# Patient Record
Sex: MALE | Race: WHITE | Employment: OTHER | URBAN - METROPOLITAN AREA
[De-identification: names, ages, dates, MRNs, and addresses within clinical notes are randomized per-mention and may not be internally consistent; named-entity substitution may affect disease eponyms.]

---

## 2023-11-09 ENCOUNTER — HOSPITAL ENCOUNTER (INPATIENT)
Facility: HOSPITAL | Age: 78
LOS: 1 days | Discharge: HOME | DRG: 683 | End: 2023-11-10
Attending: EMERGENCY MEDICINE | Admitting: INTERNAL MEDICINE
Payer: MEDICARE

## 2023-11-09 ENCOUNTER — APPOINTMENT (OUTPATIENT)
Dept: RADIOLOGY | Facility: HOSPITAL | Age: 78
DRG: 683 | End: 2023-11-09
Payer: MEDICARE

## 2023-11-09 ENCOUNTER — HOSPITAL ENCOUNTER (OUTPATIENT)
Dept: CARDIOLOGY | Facility: HOSPITAL | Age: 78
Discharge: HOME | End: 2023-11-09
Payer: MEDICARE

## 2023-11-09 DIAGNOSIS — R50.81 FEVER IN OTHER DISEASES: ICD-10-CM

## 2023-11-09 DIAGNOSIS — N17.9 AKI (ACUTE KIDNEY INJURY) (CMS-HCC): Primary | ICD-10-CM

## 2023-11-09 DIAGNOSIS — Z79.01 ANTICOAGULATED: ICD-10-CM

## 2023-11-09 DIAGNOSIS — F32.89 OTHER DEPRESSION: ICD-10-CM

## 2023-11-09 DIAGNOSIS — F51.01 PRIMARY INSOMNIA: ICD-10-CM

## 2023-11-09 DIAGNOSIS — N40.0 BENIGN PROSTATIC HYPERPLASIA WITHOUT LOWER URINARY TRACT SYMPTOMS: ICD-10-CM

## 2023-11-09 DIAGNOSIS — E78.00 HYPERCHOLESTEREMIA: ICD-10-CM

## 2023-11-09 DIAGNOSIS — I10 HYPERTENSION, UNSPECIFIED TYPE: ICD-10-CM

## 2023-11-09 DIAGNOSIS — R53.1 WEAKNESS: ICD-10-CM

## 2023-11-09 LAB
ALBUMIN SERPL BCP-MCNC: 4.8 G/DL (ref 3.4–5)
ALP SERPL-CCNC: 163 U/L (ref 33–136)
ALT SERPL W P-5'-P-CCNC: 35 U/L (ref 10–52)
AMORPH CRY #/AREA UR COMP ASSIST: ABNORMAL /HPF
ANION GAP SERPL CALC-SCNC: 20 MMOL/L (ref 10–20)
APPEARANCE UR: ABNORMAL
APTT PPP: 38 SECONDS (ref 27–38)
AST SERPL W P-5'-P-CCNC: 27 U/L (ref 9–39)
BACTERIA #/AREA URNS AUTO: ABNORMAL /HPF
BASOPHILS # BLD AUTO: 0.07 X10*3/UL (ref 0–0.1)
BASOPHILS NFR BLD AUTO: 0.5 %
BILIRUB SERPL-MCNC: 1 MG/DL (ref 0–1.2)
BILIRUB UR STRIP.AUTO-MCNC: NEGATIVE MG/DL
BNP SERPL-MCNC: 46 PG/ML (ref 0–99)
BUN SERPL-MCNC: 83 MG/DL (ref 6–23)
CALCIUM SERPL-MCNC: 9.5 MG/DL (ref 8.6–10.3)
CAOX CRY #/AREA UR COMP ASSIST: ABNORMAL /HPF
CARDIAC TROPONIN I PNL SERPL HS: 28 NG/L (ref 0–20)
CARDIAC TROPONIN I PNL SERPL HS: 36 NG/L (ref 0–20)
CHLORIDE SERPL-SCNC: 92 MMOL/L (ref 98–107)
CO2 SERPL-SCNC: 20 MMOL/L (ref 21–32)
COLOR UR: YELLOW
CREAT SERPL-MCNC: 2.4 MG/DL (ref 0.5–1.3)
EOSINOPHIL # BLD AUTO: 0.11 X10*3/UL (ref 0–0.4)
EOSINOPHIL NFR BLD AUTO: 0.8 %
ERYTHROCYTE [DISTWIDTH] IN BLOOD BY AUTOMATED COUNT: 15 % (ref 11.5–14.5)
FLUAV RNA RESP QL NAA+PROBE: NOT DETECTED
FLUBV RNA RESP QL NAA+PROBE: NOT DETECTED
GFR SERPL CREATININE-BSD FRML MDRD: 27 ML/MIN/1.73M*2
GLUCOSE BLD MANUAL STRIP-MCNC: 208 MG/DL (ref 74–99)
GLUCOSE SERPL-MCNC: 200 MG/DL (ref 74–99)
GLUCOSE UR STRIP.AUTO-MCNC: ABNORMAL MG/DL
HCT VFR BLD AUTO: 56.1 % (ref 41–52)
HGB BLD-MCNC: 18.9 G/DL (ref 13.5–17.5)
HYALINE CASTS #/AREA URNS AUTO: ABNORMAL /LPF
IMM GRANULOCYTES # BLD AUTO: 0.18 X10*3/UL (ref 0–0.5)
IMM GRANULOCYTES NFR BLD AUTO: 1.3 % (ref 0–0.9)
INR PPP: 2.3 (ref 0.9–1.1)
KETONES UR STRIP.AUTO-MCNC: NEGATIVE MG/DL
LACTATE SERPL-SCNC: 1.4 MMOL/L (ref 0.4–2)
LACTATE SERPL-SCNC: 2.1 MMOL/L (ref 0.4–2)
LEUKOCYTE ESTERASE UR QL STRIP.AUTO: NEGATIVE
LYMPHOCYTES # BLD AUTO: 0.96 X10*3/UL (ref 0.8–3)
LYMPHOCYTES NFR BLD AUTO: 7 %
MCH RBC QN AUTO: 29.6 PG (ref 26–34)
MCHC RBC AUTO-ENTMCNC: 33.7 G/DL (ref 32–36)
MCV RBC AUTO: 88 FL (ref 80–100)
MONOCYTES # BLD AUTO: 0.99 X10*3/UL (ref 0.05–0.8)
MONOCYTES NFR BLD AUTO: 7.2 %
MUCOUS THREADS #/AREA URNS AUTO: ABNORMAL /LPF
NEUTROPHILS # BLD AUTO: 11.45 X10*3/UL (ref 1.6–5.5)
NEUTROPHILS NFR BLD AUTO: 83.2 %
NITRITE UR QL STRIP.AUTO: NEGATIVE
NRBC BLD-RTO: 0 /100 WBCS (ref 0–0)
PH UR STRIP.AUTO: 5 [PH]
PLATELET # BLD AUTO: 262 X10*3/UL (ref 150–450)
POTASSIUM SERPL-SCNC: 4 MMOL/L (ref 3.5–5.3)
PROT SERPL-MCNC: 8.1 G/DL (ref 6.4–8.2)
PROT UR STRIP.AUTO-MCNC: ABNORMAL MG/DL
PROTHROMBIN TIME: 26.4 SECONDS (ref 9.8–12.8)
RBC # BLD AUTO: 6.39 X10*6/UL (ref 4.5–5.9)
RBC # UR STRIP.AUTO: ABNORMAL /UL
RBC #/AREA URNS AUTO: ABNORMAL /HPF
SARS-COV-2 RNA RESP QL NAA+PROBE: NOT DETECTED
SODIUM SERPL-SCNC: 128 MMOL/L (ref 136–145)
SP GR UR STRIP.AUTO: 1.02
UROBILINOGEN UR STRIP.AUTO-MCNC: <2 MG/DL
WBC # BLD AUTO: 13.8 X10*3/UL (ref 4.4–11.3)
WBC #/AREA URNS AUTO: ABNORMAL /HPF

## 2023-11-09 PROCEDURE — 71045 X-RAY EXAM CHEST 1 VIEW: CPT

## 2023-11-09 PROCEDURE — 36415 COLL VENOUS BLD VENIPUNCTURE: CPT | Performed by: PHYSICIAN ASSISTANT

## 2023-11-09 PROCEDURE — 74176 CT ABD & PELVIS W/O CONTRAST: CPT | Performed by: RADIOLOGY

## 2023-11-09 PROCEDURE — 83605 ASSAY OF LACTIC ACID: CPT | Performed by: PHYSICIAN ASSISTANT

## 2023-11-09 PROCEDURE — G0378 HOSPITAL OBSERVATION PER HR: HCPCS

## 2023-11-09 PROCEDURE — 85730 THROMBOPLASTIN TIME PARTIAL: CPT | Performed by: PHYSICIAN ASSISTANT

## 2023-11-09 PROCEDURE — 83880 ASSAY OF NATRIURETIC PEPTIDE: CPT | Performed by: INTERNAL MEDICINE

## 2023-11-09 PROCEDURE — 99285 EMERGENCY DEPT VISIT HI MDM: CPT | Mod: 25 | Performed by: EMERGENCY MEDICINE

## 2023-11-09 PROCEDURE — 84484 ASSAY OF TROPONIN QUANT: CPT | Performed by: PHYSICIAN ASSISTANT

## 2023-11-09 PROCEDURE — 85025 COMPLETE CBC W/AUTO DIFF WBC: CPT | Performed by: PHYSICIAN ASSISTANT

## 2023-11-09 PROCEDURE — 70450 CT HEAD/BRAIN W/O DYE: CPT

## 2023-11-09 PROCEDURE — 80053 COMPREHEN METABOLIC PANEL: CPT | Performed by: PHYSICIAN ASSISTANT

## 2023-11-09 PROCEDURE — 74176 CT ABD & PELVIS W/O CONTRAST: CPT

## 2023-11-09 PROCEDURE — 2500000002 HC RX 250 W HCPCS SELF ADMINISTERED DRUGS (ALT 637 FOR MEDICARE OP, ALT 636 FOR OP/ED): Performed by: INTERNAL MEDICINE

## 2023-11-09 PROCEDURE — 87636 SARSCOV2 & INF A&B AMP PRB: CPT | Performed by: PHYSICIAN ASSISTANT

## 2023-11-09 PROCEDURE — 93005 ELECTROCARDIOGRAM TRACING: CPT

## 2023-11-09 PROCEDURE — 82947 ASSAY GLUCOSE BLOOD QUANT: CPT

## 2023-11-09 PROCEDURE — 2500000004 HC RX 250 GENERAL PHARMACY W/ HCPCS (ALT 636 FOR OP/ED): Performed by: PHYSICIAN ASSISTANT

## 2023-11-09 PROCEDURE — 96372 THER/PROPH/DIAG INJ SC/IM: CPT

## 2023-11-09 PROCEDURE — 85610 PROTHROMBIN TIME: CPT | Performed by: PHYSICIAN ASSISTANT

## 2023-11-09 PROCEDURE — 2500000004 HC RX 250 GENERAL PHARMACY W/ HCPCS (ALT 636 FOR OP/ED): Performed by: INTERNAL MEDICINE

## 2023-11-09 PROCEDURE — 87040 BLOOD CULTURE FOR BACTERIA: CPT | Mod: PARLAB | Performed by: PHYSICIAN ASSISTANT

## 2023-11-09 PROCEDURE — 2500000001 HC RX 250 WO HCPCS SELF ADMINISTERED DRUGS (ALT 637 FOR MEDICARE OP): Performed by: INTERNAL MEDICINE

## 2023-11-09 PROCEDURE — 2500000005 HC RX 250 GENERAL PHARMACY W/O HCPCS: Performed by: PHYSICIAN ASSISTANT

## 2023-11-09 PROCEDURE — 70450 CT HEAD/BRAIN W/O DYE: CPT | Performed by: RADIOLOGY

## 2023-11-09 PROCEDURE — 81001 URINALYSIS AUTO W/SCOPE: CPT | Performed by: PHYSICIAN ASSISTANT

## 2023-11-09 PROCEDURE — 71045 X-RAY EXAM CHEST 1 VIEW: CPT | Performed by: RADIOLOGY

## 2023-11-09 RX ORDER — ACETAMINOPHEN 650 MG/1
650 SUPPOSITORY RECTAL EVERY 4 HOURS PRN
Status: DISCONTINUED | OUTPATIENT
Start: 2023-11-09 | End: 2023-11-10 | Stop reason: HOSPADM

## 2023-11-09 RX ORDER — ACETAMINOPHEN 160 MG/5ML
650 SOLUTION ORAL EVERY 4 HOURS PRN
Status: DISCONTINUED | OUTPATIENT
Start: 2023-11-09 | End: 2023-11-10 | Stop reason: HOSPADM

## 2023-11-09 RX ORDER — LIDOCAINE 560 MG/1
1 PATCH PERCUTANEOUS; TOPICAL; TRANSDERMAL DAILY
Status: DISCONTINUED | OUTPATIENT
Start: 2023-11-09 | End: 2023-11-10 | Stop reason: HOSPADM

## 2023-11-09 RX ORDER — SODIUM CHLORIDE 9 MG/ML
100 INJECTION, SOLUTION INTRAVENOUS CONTINUOUS
Status: DISCONTINUED | OUTPATIENT
Start: 2023-11-09 | End: 2023-11-10 | Stop reason: HOSPADM

## 2023-11-09 RX ORDER — WARFARIN SODIUM 5 MG/1
5 TABLET ORAL DAILY
Status: DISCONTINUED | OUTPATIENT
Start: 2023-11-10 | End: 2023-11-10 | Stop reason: HOSPADM

## 2023-11-09 RX ORDER — DEXTROSE 50 % IN WATER (D50W) INTRAVENOUS SYRINGE
25
Status: DISCONTINUED | OUTPATIENT
Start: 2023-11-09 | End: 2023-11-09

## 2023-11-09 RX ORDER — ONDANSETRON 4 MG/1
4 TABLET, FILM COATED ORAL EVERY 8 HOURS PRN
Status: DISCONTINUED | OUTPATIENT
Start: 2023-11-09 | End: 2023-11-10 | Stop reason: HOSPADM

## 2023-11-09 RX ORDER — ACETAMINOPHEN 325 MG/1
650 TABLET ORAL EVERY 4 HOURS PRN
Status: DISCONTINUED | OUTPATIENT
Start: 2023-11-09 | End: 2023-11-10 | Stop reason: HOSPADM

## 2023-11-09 RX ORDER — TALC
3 POWDER (GRAM) TOPICAL DAILY
Status: DISCONTINUED | OUTPATIENT
Start: 2023-11-09 | End: 2023-11-10 | Stop reason: HOSPADM

## 2023-11-09 RX ORDER — ONDANSETRON HYDROCHLORIDE 2 MG/ML
4 INJECTION, SOLUTION INTRAVENOUS EVERY 8 HOURS PRN
Status: DISCONTINUED | OUTPATIENT
Start: 2023-11-09 | End: 2023-11-10 | Stop reason: HOSPADM

## 2023-11-09 RX ORDER — INSULIN LISPRO 100 [IU]/ML
0-10 INJECTION, SOLUTION INTRAVENOUS; SUBCUTANEOUS
Status: DISCONTINUED | OUTPATIENT
Start: 2023-11-09 | End: 2023-11-10 | Stop reason: HOSPADM

## 2023-11-09 RX ADMIN — SODIUM CHLORIDE 100 ML/HR: 9 INJECTION, SOLUTION INTRAVENOUS at 23:33

## 2023-11-09 RX ADMIN — ACETAMINOPHEN 650 MG: 325 TABLET ORAL at 23:32

## 2023-11-09 RX ADMIN — SODIUM CHLORIDE 500 ML: 9 INJECTION, SOLUTION INTRAVENOUS at 15:00

## 2023-11-09 RX ADMIN — LIDOCAINE 1 PATCH: 4 PATCH TOPICAL at 17:31

## 2023-11-09 RX ADMIN — INSULIN LISPRO 4 UNITS: 100 INJECTION, SOLUTION INTRAVENOUS; SUBCUTANEOUS at 21:12

## 2023-11-09 RX ADMIN — Medication 3 MG: at 23:32

## 2023-11-09 SDOH — SOCIAL STABILITY: SOCIAL INSECURITY: DO YOU FEEL UNSAFE GOING BACK TO THE PLACE WHERE YOU ARE LIVING?: NO

## 2023-11-09 SDOH — SOCIAL STABILITY: SOCIAL INSECURITY: ARE YOU OR HAVE YOU BEEN THREATENED OR ABUSED PHYSICALLY, EMOTIONALLY, OR SEXUALLY BY ANYONE?: NO

## 2023-11-09 SDOH — SOCIAL STABILITY: SOCIAL INSECURITY: DOES ANYONE TRY TO KEEP YOU FROM HAVING/CONTACTING OTHER FRIENDS OR DOING THINGS OUTSIDE YOUR HOME?: NO

## 2023-11-09 SDOH — SOCIAL STABILITY: SOCIAL INSECURITY: DO YOU FEEL ANYONE HAS EXPLOITED OR TAKEN ADVANTAGE OF YOU FINANCIALLY OR OF YOUR PERSONAL PROPERTY?: NO

## 2023-11-09 SDOH — SOCIAL STABILITY: SOCIAL INSECURITY: WERE YOU ABLE TO COMPLETE ALL THE BEHAVIORAL HEALTH SCREENINGS?: YES

## 2023-11-09 SDOH — SOCIAL STABILITY: SOCIAL INSECURITY: HAS ANYONE EVER THREATENED TO HURT YOUR FAMILY OR YOUR PETS?: NO

## 2023-11-09 SDOH — SOCIAL STABILITY: SOCIAL INSECURITY: ABUSE: ADULT

## 2023-11-09 SDOH — SOCIAL STABILITY: SOCIAL INSECURITY: ARE THERE ANY APPARENT SIGNS OF INJURIES/BEHAVIORS THAT COULD BE RELATED TO ABUSE/NEGLECT?: NO

## 2023-11-09 SDOH — SOCIAL STABILITY: SOCIAL INSECURITY: HAVE YOU HAD THOUGHTS OF HARMING ANYONE ELSE?: NO

## 2023-11-09 ASSESSMENT — COGNITIVE AND FUNCTIONAL STATUS - GENERAL
PATIENT BASELINE BEDBOUND: NO
MOBILITY SCORE: 18
HELP NEEDED FOR BATHING: A LITTLE
PERSONAL GROOMING: A LITTLE
MOVING TO AND FROM BED TO CHAIR: A LITTLE
WALKING IN HOSPITAL ROOM: A LITTLE
MOVING FROM LYING ON BACK TO SITTING ON SIDE OF FLAT BED WITH BEDRAILS: A LITTLE
TURNING FROM BACK TO SIDE WHILE IN FLAT BAD: A LITTLE
STANDING UP FROM CHAIR USING ARMS: A LITTLE
CLIMB 3 TO 5 STEPS WITH RAILING: A LITTLE
DAILY ACTIVITIY SCORE: 18
EATING MEALS: A LITTLE
TOILETING: A LITTLE
DRESSING REGULAR LOWER BODY CLOTHING: A LITTLE
DRESSING REGULAR UPPER BODY CLOTHING: A LITTLE

## 2023-11-09 ASSESSMENT — LIFESTYLE VARIABLES
PRESCIPTION_ABUSE_PAST_12_MONTHS: NO
HOW MANY STANDARD DRINKS CONTAINING ALCOHOL DO YOU HAVE ON A TYPICAL DAY: PATIENT DOES NOT DRINK
SUBSTANCE_ABUSE_PAST_12_MONTHS: NO
AUDIT-C TOTAL SCORE: 0
SKIP TO QUESTIONS 9-10: 1
AUDIT-C TOTAL SCORE: 0
HOW OFTEN DO YOU HAVE A DRINK CONTAINING ALCOHOL: NEVER
HOW OFTEN DO YOU HAVE 6 OR MORE DRINKS ON ONE OCCASION: NEVER

## 2023-11-09 ASSESSMENT — ACTIVITIES OF DAILY LIVING (ADL)
BATHING: INDEPENDENT
GROOMING: INDEPENDENT
TOILETING: INDEPENDENT
DRESSING YOURSELF: INDEPENDENT
WALKS IN HOME: INDEPENDENT
ADEQUATE_TO_COMPLETE_ADL: YES
JUDGMENT_ADEQUATE_SAFELY_COMPLETE_DAILY_ACTIVITIES: YES
HEARING - LEFT EAR: FUNCTIONAL
LACK_OF_TRANSPORTATION: NO
PATIENT'S MEMORY ADEQUATE TO SAFELY COMPLETE DAILY ACTIVITIES?: YES
FEEDING YOURSELF: INDEPENDENT
HEARING - RIGHT EAR: FUNCTIONAL

## 2023-11-09 ASSESSMENT — COLUMBIA-SUICIDE SEVERITY RATING SCALE - C-SSRS
6. HAVE YOU EVER DONE ANYTHING, STARTED TO DO ANYTHING, OR PREPARED TO DO ANYTHING TO END YOUR LIFE?: NO
1. IN THE PAST MONTH, HAVE YOU WISHED YOU WERE DEAD OR WISHED YOU COULD GO TO SLEEP AND NOT WAKE UP?: NO
2. HAVE YOU ACTUALLY HAD ANY THOUGHTS OF KILLING YOURSELF?: NO

## 2023-11-09 ASSESSMENT — PATIENT HEALTH QUESTIONNAIRE - PHQ9
2. FEELING DOWN, DEPRESSED OR HOPELESS: NOT AT ALL
1. LITTLE INTEREST OR PLEASURE IN DOING THINGS: NOT AT ALL
SUM OF ALL RESPONSES TO PHQ9 QUESTIONS 1 & 2: 0

## 2023-11-10 ENCOUNTER — APPOINTMENT (OUTPATIENT)
Dept: RADIOLOGY | Facility: HOSPITAL | Age: 78
DRG: 683 | End: 2023-11-10
Payer: MEDICARE

## 2023-11-10 VITALS
WEIGHT: 235.89 LBS | BODY MASS INDEX: 33.02 KG/M2 | TEMPERATURE: 96.6 F | SYSTOLIC BLOOD PRESSURE: 123 MMHG | OXYGEN SATURATION: 95 % | RESPIRATION RATE: 16 BRPM | DIASTOLIC BLOOD PRESSURE: 67 MMHG | HEIGHT: 71 IN | HEART RATE: 61 BPM

## 2023-11-10 PROBLEM — N17.9 ACUTE KIDNEY INJURY (CMS-HCC): Status: RESOLVED | Noted: 2023-11-09 | Resolved: 2023-11-10

## 2023-11-10 PROBLEM — N17.9 AKI (ACUTE KIDNEY INJURY) (CMS-HCC): Status: RESOLVED | Noted: 2023-11-10 | Resolved: 2023-11-10

## 2023-11-10 PROBLEM — R50.9 FEVER: Status: RESOLVED | Noted: 2023-11-10 | Resolved: 2023-11-10

## 2023-11-10 PROBLEM — R50.9 FEVER: Status: ACTIVE | Noted: 2023-11-10

## 2023-11-10 PROBLEM — N17.9 AKI (ACUTE KIDNEY INJURY) (CMS-HCC): Status: ACTIVE | Noted: 2023-11-10

## 2023-11-10 LAB
ALBUMIN SERPL BCP-MCNC: 3.5 G/DL (ref 3.4–5)
ALP SERPL-CCNC: 118 U/L (ref 33–136)
ALT SERPL W P-5'-P-CCNC: 28 U/L (ref 10–52)
ANION GAP SERPL CALC-SCNC: 15 MMOL/L (ref 10–20)
AST SERPL W P-5'-P-CCNC: 20 U/L (ref 9–39)
BILIRUB SERPL-MCNC: 0.7 MG/DL (ref 0–1.2)
BUN SERPL-MCNC: 80 MG/DL (ref 6–23)
CALCIUM SERPL-MCNC: 8.3 MG/DL (ref 8.6–10.3)
CHLORIDE SERPL-SCNC: 99 MMOL/L (ref 98–107)
CO2 SERPL-SCNC: 22 MMOL/L (ref 21–32)
CREAT SERPL-MCNC: 1.98 MG/DL (ref 0.5–1.3)
ERYTHROCYTE [DISTWIDTH] IN BLOOD BY AUTOMATED COUNT: 14.6 % (ref 11.5–14.5)
GFR SERPL CREATININE-BSD FRML MDRD: 34 ML/MIN/1.73M*2
GLUCOSE BLD MANUAL STRIP-MCNC: 153 MG/DL (ref 74–99)
GLUCOSE BLD MANUAL STRIP-MCNC: 159 MG/DL (ref 74–99)
GLUCOSE BLD MANUAL STRIP-MCNC: 181 MG/DL (ref 74–99)
GLUCOSE SERPL-MCNC: 145 MG/DL (ref 74–99)
HCT VFR BLD AUTO: 49 % (ref 41–52)
HGB BLD-MCNC: 16 G/DL (ref 13.5–17.5)
HOLD SPECIMEN: NORMAL
MCH RBC QN AUTO: 29 PG (ref 26–34)
MCHC RBC AUTO-ENTMCNC: 32.7 G/DL (ref 32–36)
MCV RBC AUTO: 89 FL (ref 80–100)
NRBC BLD-RTO: 0 /100 WBCS (ref 0–0)
PLATELET # BLD AUTO: 169 X10*3/UL (ref 150–450)
POTASSIUM SERPL-SCNC: 4.2 MMOL/L (ref 3.5–5.3)
PROT SERPL-MCNC: 5.9 G/DL (ref 6.4–8.2)
RBC # BLD AUTO: 5.51 X10*6/UL (ref 4.5–5.9)
SODIUM SERPL-SCNC: 132 MMOL/L (ref 136–145)
WBC # BLD AUTO: 10.1 X10*3/UL (ref 4.4–11.3)

## 2023-11-10 PROCEDURE — 2500000002 HC RX 250 W HCPCS SELF ADMINISTERED DRUGS (ALT 637 FOR MEDICARE OP, ALT 636 FOR OP/ED): Performed by: INTERNAL MEDICINE

## 2023-11-10 PROCEDURE — 2500000005 HC RX 250 GENERAL PHARMACY W/O HCPCS: Performed by: INTERNAL MEDICINE

## 2023-11-10 PROCEDURE — 76770 US EXAM ABDO BACK WALL COMP: CPT

## 2023-11-10 PROCEDURE — 2500000001 HC RX 250 WO HCPCS SELF ADMINISTERED DRUGS (ALT 637 FOR MEDICARE OP): Performed by: INTERNAL MEDICINE

## 2023-11-10 PROCEDURE — 97165 OT EVAL LOW COMPLEX 30 MIN: CPT | Mod: GO

## 2023-11-10 PROCEDURE — 99223 1ST HOSP IP/OBS HIGH 75: CPT | Performed by: NURSE PRACTITIONER

## 2023-11-10 PROCEDURE — 97161 PT EVAL LOW COMPLEX 20 MIN: CPT | Mod: GP

## 2023-11-10 PROCEDURE — 76770 US EXAM ABDO BACK WALL COMP: CPT | Performed by: RADIOLOGY

## 2023-11-10 PROCEDURE — 36415 COLL VENOUS BLD VENIPUNCTURE: CPT | Performed by: INTERNAL MEDICINE

## 2023-11-10 PROCEDURE — 2500000004 HC RX 250 GENERAL PHARMACY W/ HCPCS (ALT 636 FOR OP/ED): Performed by: INTERNAL MEDICINE

## 2023-11-10 PROCEDURE — 1100000001 HC PRIVATE ROOM DAILY

## 2023-11-10 PROCEDURE — 85027 COMPLETE CBC AUTOMATED: CPT | Performed by: INTERNAL MEDICINE

## 2023-11-10 PROCEDURE — 80053 COMPREHEN METABOLIC PANEL: CPT | Performed by: INTERNAL MEDICINE

## 2023-11-10 PROCEDURE — 82947 ASSAY GLUCOSE BLOOD QUANT: CPT

## 2023-11-10 RX ORDER — ATORVASTATIN CALCIUM 40 MG/1
40 TABLET, FILM COATED ORAL NIGHTLY
Qty: 30 TABLET | Refills: 0 | Status: SHIPPED | OUTPATIENT
Start: 2023-11-10

## 2023-11-10 RX ORDER — SERTRALINE HYDROCHLORIDE 100 MG/1
100 TABLET, FILM COATED ORAL DAILY
Status: DISCONTINUED | OUTPATIENT
Start: 2023-11-10 | End: 2023-11-10 | Stop reason: HOSPADM

## 2023-11-10 RX ORDER — METOPROLOL TARTRATE 50 MG/1
50 TABLET ORAL 2 TIMES DAILY
Status: SHIPPED | OUTPATIENT
Start: 2023-11-10

## 2023-11-10 RX ORDER — TAMSULOSIN HYDROCHLORIDE 0.4 MG/1
0.4 CAPSULE ORAL DAILY
Status: DISCONTINUED | OUTPATIENT
Start: 2023-11-10 | End: 2023-11-10 | Stop reason: HOSPADM

## 2023-11-10 RX ORDER — ATORVASTATIN CALCIUM 40 MG/1
40 TABLET, FILM COATED ORAL NIGHTLY
Status: DISCONTINUED | OUTPATIENT
Start: 2023-11-10 | End: 2023-11-10 | Stop reason: HOSPADM

## 2023-11-10 RX ORDER — WARFARIN SODIUM 5 MG/1
TABLET ORAL
Qty: 30 TABLET | Refills: 0 | Status: SHIPPED | OUTPATIENT
Start: 2023-11-11 | End: 2024-11-09

## 2023-11-10 RX ORDER — ACETAMINOPHEN 325 MG/1
650 TABLET ORAL EVERY 4 HOURS PRN
Qty: 30 TABLET | Refills: 0 | Status: SHIPPED | OUTPATIENT
Start: 2023-11-10

## 2023-11-10 RX ORDER — SERTRALINE HYDROCHLORIDE 100 MG/1
100 TABLET, FILM COATED ORAL DAILY
Qty: 30 TABLET | Refills: 0 | Status: SHIPPED | OUTPATIENT
Start: 2023-11-11

## 2023-11-10 RX ORDER — TALC
3 POWDER (GRAM) TOPICAL DAILY
Qty: 30 TABLET | Refills: 0 | Status: SHIPPED | OUTPATIENT
Start: 2023-11-10

## 2023-11-10 RX ORDER — TAMSULOSIN HYDROCHLORIDE 0.4 MG/1
0.4 CAPSULE ORAL DAILY
Qty: 30 CAPSULE | Refills: 0 | Status: SHIPPED | OUTPATIENT
Start: 2023-11-11

## 2023-11-10 RX ADMIN — SERTRALINE HYDROCHLORIDE 100 MG: 100 TABLET ORAL at 15:31

## 2023-11-10 RX ADMIN — ACETAMINOPHEN 650 MG: 325 TABLET ORAL at 17:38

## 2023-11-10 RX ADMIN — LIDOCAINE 1 PATCH: 4 PATCH TOPICAL at 09:22

## 2023-11-10 RX ADMIN — WARFARIN SODIUM 5 MG: 5 TABLET ORAL at 17:38

## 2023-11-10 RX ADMIN — Medication 3 MG: at 18:16

## 2023-11-10 RX ADMIN — INSULIN LISPRO 2 UNITS: 100 INJECTION, SOLUTION INTRAVENOUS; SUBCUTANEOUS at 12:32

## 2023-11-10 RX ADMIN — TAMSULOSIN HYDROCHLORIDE 0.4 MG: 0.4 CAPSULE ORAL at 15:31

## 2023-11-10 ASSESSMENT — PAIN - FUNCTIONAL ASSESSMENT
PAIN_FUNCTIONAL_ASSESSMENT: 0-10
PAIN_FUNCTIONAL_ASSESSMENT: 0-10

## 2023-11-10 ASSESSMENT — COGNITIVE AND FUNCTIONAL STATUS - GENERAL
WALKING IN HOSPITAL ROOM: A LITTLE
MOVING FROM LYING ON BACK TO SITTING ON SIDE OF FLAT BED WITH BEDRAILS: A LITTLE
DAILY ACTIVITIY SCORE: 19
DRESSING REGULAR UPPER BODY CLOTHING: A LITTLE
TOILETING: A LITTLE
MOVING TO AND FROM BED TO CHAIR: A LITTLE
MOBILITY SCORE: 17
CLIMB 3 TO 5 STEPS WITH RAILING: A LOT
HELP NEEDED FOR BATHING: A LITTLE
STANDING UP FROM CHAIR USING ARMS: A LITTLE
TURNING FROM BACK TO SIDE WHILE IN FLAT BAD: A LITTLE
DRESSING REGULAR LOWER BODY CLOTHING: A LITTLE
PERSONAL GROOMING: A LITTLE

## 2023-11-10 ASSESSMENT — PAIN SCALES - GENERAL
PAINLEVEL_OUTOF10: 0 - NO PAIN
PAINLEVEL_OUTOF10: 3

## 2023-11-10 NOTE — PROGRESS NOTES
Physical Therapy    Physical Therapy Evaluation    Patient Name: Damon Barone  MRN: [id number]  Today's Date: 11/10/2023   Time Calculation  Start Time: 0921  Stop Time: 0930  Time Calculation (min): 9 min    Assessment/Plan   PT Assessment  PT Assessment Results: Decreased strength, Decreased endurance, Impaired balance, Decreased mobility, Pain  Rehab Prognosis: Good  End of Session Communication: Bedside nurse  End of Session Patient Position: Bed, 2 rail up, Alarm off, not on at start of session  IP OR SWING BED PT PLAN  Inpatient or Swing Bed: Inpatient  PT Plan  PT Plan: Skilled PT  PT Frequency: 3 times per week  PT Discharge Recommendations: Moderate intensity level of continued care  PT - OK to Discharge: Yes      Subjective   General Visit Information:  General  Reason for Referral:  (general weakness, lightheaded, r lateral rib pain, fx of r rib hypotensive, uti, pna, dehydration, electrolyte abnormalities, kenyatta, head ct (-))  Past Medical History Relevant to Rehab:  (dm, bph, heart block w/ pacer, htn, depression, hld, hypothyroid, akr, cardioversion, back sx, hernia repair, rcr, knee sx difficulty walking, lbp, urinary freq,)  Prior to Session Communication: Bedside nurse  Patient Position Received: Bed, 2 rail up  Home Living:  Home Living  Type of Home:  (1 story 1 step in)  Lives With: Alone  Home Adaptive Equipment:  (sc, rw not used)  Prior Level of Function:  Prior Function Per Pt/Caregiver Report  Level of [place]:  (indep)  Homemaking Assistance:  (indep)  Precautions:  Precautions  Precautions Comment:  (falls)  Vital Signs:       Objective   Pain:  Pain Assessment  Pain Assessment:  (3/10 r rib area)  Cognition:  Cognition  Overall Cognitive Status: Within Functional Limits  ctional Assessments:  Bed Mobility  Bed Mobility:  (sba)    Transfers  Transfer:  (cga)    Ambulation/Gait Training  Ambulation/Gait Training Performed:  (w/o ad min of 1, w/ rw ~20ft cga , antalgic, high fall  risk)  Extremity/Trunk Assessments:  RLE   RLE :  (wfl)  LLE   LLE : Within Functional Limits  Outcome Measures:  [facility] Basic Mobility  Turning from your back to your side while in a flat bed without using bedrails: A little  Moving from lying on your back to sitting on the side of a flat bed without using bedrails: A little  Moving to and from bed to chair (including a wheelchair): A little  Standing up from a chair using your arms (e.g. wheelchair or bedside chair): A little  To walk in hospital room: A little  Climbing 3-5 steps with railing: A lot  Basic Mobility - Total Score: 17    Encounter Problems       Encounter Problems (Active)       PT Problem       PT Goal 1 (Progressing)       Start:  11/10/23    Expected End:  11/24/23       Indep bed mob          PT Goal 2 (Progressing)       Start:  11/10/23    Expected End:  11/24/23       Indep txs         PT Goal 3 (Progressing)       Start:  11/10/23    Expected End:  11/24/23       Mod indep/ w/  least ad 150ft x3         PT Goal 4 (Progressing)       Start:  11/10/23    Expected End:  11/24/23       20-30 reps ble there ex                Education Documentation  Mobility Training, taught by Anai Harrell, PT at 11/10/2023  2:34 PM.  Learner: Patient  Readiness: Acceptance  Method: Explanation  Response: Verbalizes Understanding    Education Comments  No comments found.

## 2023-11-10 NOTE — H&P
History Of Present Illness  Damon Barone is a 78 y.o. male presenting to emergency department for evaluation of generalized weakness.  Patient reports that he just generally does not feel well and has not for the last several days.  Patient also reports pain to the right lateral rib and a slight headache.  He reports that he has been feeling very lightheaded at home.  Patient denies cough or congestion.  Patient reports feeling slightly short of breath due to right-sided rib pain.  Patient denies any fall or trauma.  Patient denies nausea, vomiting, diarrhea.  Patient denies abdominal pain.  Patient denies recent illness, fever, or chills.    In ED, a chest x-ray was completed and patient was found to have a 6 rib fracture, slightly displaced, per radiology review.  Flu negative, COVID-negative, lactate 1.4.  Glucose 200, sodium 128, chloride 92, bicarb 20, BUN 83, creatinine 2.40, GFR 27, alk phos 163, troponin 36 with a repeat of 28.  WBCs 13.8, hemoglobin 18.9, hematocrit 56.1, PT 26.4, INR 2.3.  Urinalysis showing moderate blood, 50 glucose, 30 protein, 1+ bacteria.  Blood pressure 111/84, heart rate 74, respirations 28, temperature 36.2 °C, SPO2 96% on room air.  CT of the head completed showing no acute process, bifrontal subdural hygromas noted per radiology review.  CT of the abdomen pelvis completed showing bilateral renal cortical atrophy with fat stranding around the kidneys per radiology review.  Patient given IV fluids, BNP 46.  Blood cultures pending.  Patient admitted to telemetry observation under the care of Dr. Singh who will continue to follow.  I was asked to do H&P and place initial admission orders.     Past Medical History  Diabetes, BPH, A-fib, complete heart block s/p pacemaker, hypertension, depression, hyperlipidemia, hypothyroidism    Surgical History  AVR, EGD, cardioversion, back surgery, pacemaker, colonoscopy, hernia repair, rotator cuff repair, knee surgery, cholecystectomy     Social  "History  Former smoker, no drug use, no alcohol use    Family History  Reviewed and noncontributory  Allergies  Codeine    Review of Systems  A 10 point review of systems was completed and negative except what is listed in HPI  Physical Exam  Constitutional:       Appearance: Normal appearance.   HENT:      Nose: Nose normal.      Mouth/Throat:      Mouth: Mucous membranes are dry.      Pharynx: Oropharynx is clear.   Eyes:      Pupils: Pupils are equal, round, and reactive to light.   Cardiovascular:      Rate and Rhythm: Normal rate and regular rhythm.   Pulmonary:      Effort: Pulmonary effort is normal.      Breath sounds: Normal breath sounds.   Abdominal:      General: Bowel sounds are normal.      Palpations: Abdomen is soft.   Musculoskeletal:         General: Normal range of motion.      Cervical back: Normal range of motion.   Skin:     General: Skin is warm and dry.      Capillary Refill: Capillary refill takes less than 2 seconds.   Neurological:      General: No focal deficit present.      Mental Status: He is alert and oriented to person, place, and time.   Psychiatric:         Mood and Affect: Mood normal.         Behavior: Behavior normal.          Last Recorded Vitals  Blood pressure 136/74, pulse 52, temperature 35.4 °C (95.7 °F), temperature source Temporal, resp. rate 18, height 1.803 m (5' 10.98\"), weight 107 kg (235 lb 14.3 oz), SpO2 98 %.    Relevant Results  CT abdomen pelvis wo IV contrast    Result Date: 11/9/2023  Interpreted By:  Walter Castellanos, STUDY: CT of the abdomen and pelvis  without contrast dated  11/9/2023.   INDICATION: Abdominal pain.   COMPARISON: None.   ACCESSION NUMBER(S): [id number]   ORDERING CLINICIAN: NILS HEIN   TECHNIQUE: Axial CT of the abdomen and pelvis was performed  without intravenous contrast. Sagittal and coronal 2 dimensional reformats were obtained.   FINDINGS: LUNG BASES AND VESSELS:   Scarring and/or atelectasis is seen in the lower lung zones. No " pleural effusion is evident.  The heart is not enlarged. No pericardial effusion is evident.   Is partial visualization of pulse generator leads. Calcified atheromatous disease is seen in the visualized arterial tree without aneurysmal dilation evident.   LIVER AND BILIARY TREE:   Is grossly unremarkable. No intra- or extrahepatic biliary dilation is evident.   GALLBLADDER:   The gallbladder is surgically absent.   SPLEEN:   Is grossly unremarkable.   ADRENAL GLANDS:   Are grossly unremarkable.   PANCREAS:   Is grossly unremarkable.   KIDNEYS AND URETERS:   There is bilateral renal cortical atrophy. There are multiple hypodense structures on both kidneys which are too small to fully characterize but likely represent small cysts. There are bilateral punctate densities at the renal renee fat stranding is seen around both kidneys. No hydroureteronephrosis is evident.   PERITONEUM/RETROPERITONEUM:   No free fluid, free air, or lymphadenopathy is evident in the abdomen or pelvis.   BOWEL:   No bowel obstruction is evident.  The appendix is visualized and is grossly unremarkable.  There is diverticulosis coli without CT findings evident to suggest acute diverticulitis.   BLADDER:   Is grossly unremarkable.   REPRODUCTIVE ORGANS:   Prostate gland does not appear to be enlarged.   ABDOMINAL WALL:   There is a small fat filled umbilical hernia.   OSSEOUS STRUCTURES:   There is a right hip arthroplasty. It is not well assessed on this exam. Mild-to-moderate degenerative changes seen of the left hip. Mild degenerative changes seen of the SI joints and pubic symphysis. Multilevel degenerative changes seen of the spine.       1. Bilateral renal cortical atrophy with fat stranding around the kidneys. The fat stranding is nonspecific. Knowledge of any clinical or laboratory findings to suggest pyelonephritis may be helpful. 2. Punctate densities of the bilateral renal pelvis favored to represent vascular calcifications although  punctate renal calculi are not excluded. No hydroureteronephrosis is evident. 3. Diverticulosis coli without CT findings evident to suggest acute diverticulitis.   Signed by: Walter Castellanos 11/9/2023 9:05 PM Dictation workstation:   [id number]    CT head wo IV contrast    Result Date: 11/9/2023  Interpreted By:  Walter Castellanos, STUDY: CT of the brain without contrast dated  11/9/2023.   INDICATION: Signs/Symptoms:ams   COMPARISON: None.   ACCESSION NUMBER(S): [id number]   ORDERING CLINICIAN: NILS HEIN   TECHNIQUE: Axial non-contrast CT of the brain was performed. Sagittal and coronal 2D reformats were obtained.   FINDINGS: BRAIN PARENCHYMA:   No acute intracranial hemorrhage or infarction is evident. Encephalomalacia is seen in the left occipital lobe. There are deep and periventricular white matter hypodensities compatible with chronic ischemic demyelination.   VENTRICLES AND EXTRA-AXIAL SPACES:   No hydrocephalus or midline shift is evident.  There is mild cerebral volume loss. There are bifrontal subdural hygromas.   INTRACRANIAL VESSELS:   Calcified atheromatous disease is seen at the carotid siphons.   SINUSES AND MASTOIDS:   The sinuses and mastoids are clear.   OSSEOUS STRUCTURES:   No osseous injury is evident.   SOFT TISSUES:   Visualized associated soft tissues are grossly unremarkable.       1. No acute intracranial hemorrhage or large territory infarction is evident. 2. Bifrontal subdural hygromas. 3. Left occipital encephalomalacia.   Signed by: Walter Castellanos 11/9/2023 8:57 PM Dictation workstation:   [id number]    XR chest 1 view    Result Date: 11/9/2023  Interpreted By:  Srikanth Gomez, STUDY: XR CHEST 1 VIEW;  11/9/2023 2:34 pm   INDICATION: Signs/Symptoms:right rib pain.   COMPARISON: 06/20/2011   ACCESSION NUMBER(S): [id number]   ORDERING CLINICIAN: NILS HEIN   FINDINGS: Pacemaker leads are intact and properly positioned.       CARDIOMEDIASTINAL SILHOUETTE: Cardiomediastinal  silhouette is normal in size and configuration.   LUNGS: No pneumothorax or pleural effusion status post right rib fracture   ABDOMEN: No remarkable upper abdominal findings.   BONES: Slightly displaced fracture of the right posterolateral 6th rib       1.  Slightly displaced posterolateral fracture of the right 6th rib. No pneumothorax, pulmonary contusion or pleural effusion       MACRO: None   Signed by: Srikanth Gomez 11/9/2023 2:47 PM Dictation workstation:   [id number]         Results for orders placed or performed during the hospital encounter of 11/09/23 (from the past 24 hour(s))   CBC and Auto Differential   Result Value Ref Range    WBC 13.8 (H) 4.4 - 11.3 x10*3/uL    nRBC 0.0 0.0 - 0.0 /100 WBCs    RBC 6.39 (H) 4.50 - 5.90 x10*6/uL    Hemoglobin 18.9 (H) 13.5 - 17.5 g/dL    Hematocrit 56.1 (H) 41.0 - 52.0 %    MCV 88 80 - 100 fL    MCH 29.6 26.0 - 34.0 pg    MCHC 33.7 32.0 - 36.0 g/dL    RDW 15.0 (H) 11.5 - 14.5 %    Platelets 262 150 - 450 x10*3/uL    Neutrophils % 83.2 40.0 - 80.0 %    Immature Granulocytes %, Automated 1.3 (H) 0.0 - 0.9 %    Lymphocytes % 7.0 13.0 - 44.0 %    Monocytes % 7.2 2.0 - 10.0 %    Eosinophils % 0.8 0.0 - 6.0 %    Basophils % 0.5 0.0 - 2.0 %    Neutrophils Absolute 11.45 (H) 1.60 - 5.50 x10*3/uL    Immature Granulocytes Absolute, Automated 0.18 0.00 - 0.50 x10*3/uL    Lymphocytes Absolute 0.96 0.80 - 3.00 x10*3/uL    Monocytes Absolute 0.99 (H) 0.05 - 0.80 x10*3/uL    Eosinophils Absolute 0.11 0.00 - 0.40 x10*3/uL    Basophils Absolute 0.07 0.00 - 0.10 x10*3/uL   Comprehensive metabolic panel   Result Value Ref Range    Glucose 200 (H) 74 - 99 mg/dL    Sodium 128 (L) 136 - 145 mmol/L    Potassium 4.0 3.5 - 5.3 mmol/L    Chloride 92 (L) 98 - 107 mmol/L    Bicarbonate 20 (L) 21 - 32 mmol/L    Anion Gap 20 10 - 20 mmol/L    Urea Nitrogen 83 (H) 6 - 23 mg/dL    Creatinine 2.40 (H) 0.50 - 1.30 mg/dL    eGFR 27 (L) >60 mL/min/1.73m*2    Calcium 9.5 8.6 - 10.3 mg/dL    Albumin  4.8 3.4 - 5.0 g/dL    Alkaline Phosphatase 163 (H) 33 - 136 U/L    Total Protein 8.1 6.4 - 8.2 g/dL    AST 27 9 - 39 U/L    Bilirubin, Total 1.0 0.0 - 1.2 mg/dL    ALT 35 10 - 52 U/L   Troponin I, High Sensitivity   Result Value Ref Range    Troponin I, High Sensitivity 36 (H) 0 - 20 ng/L   Lactate   Result Value Ref Range    Lactate 2.1 (H) 0.4 - 2.0 mmol/L   Blood Culture    Specimen: Peripheral Venipuncture; Blood culture   Result Value Ref Range    Blood Culture Loaded on Instrument - Culture in progress    Blood Culture    Specimen: Peripheral Venipuncture; Blood culture   Result Value Ref Range    Blood Culture Loaded on Instrument - Culture in progress    B-type natriuretic peptide   Result Value Ref Range    BNP 46 0 - 99 pg/mL   Influenza A, and B PCR   Result Value Ref Range    Flu A Result Not Detected Not Detected    Flu B Result Not Detected Not Detected   SARS-CoV-2 RT PCR   Result Value Ref Range    Coronavirus 2019, PCR Not Detected Not Detected   Lactate   Result Value Ref Range    Lactate 1.4 0.4 - 2.0 mmol/L   Urinalysis with Reflex Microscopic and Culture   Result Value Ref Range    Color, Urine Yellow Straw, Yellow    Appearance, Urine Hazy (N) Clear    Specific Gravity, Urine 1.016 1.005 - 1.035    pH, Urine 5.0 5.0, 5.5, 6.0, 6.5, 7.0, 7.5, 8.0    Protein, Urine 30 (1+) (N) NEGATIVE mg/dL    Glucose, Urine 50 (1+) (A) NEGATIVE mg/dL    Blood, Urine MODERATE (2+) (A) NEGATIVE    Ketones, Urine NEGATIVE NEGATIVE mg/dL    Bilirubin, Urine NEGATIVE NEGATIVE    Urobilinogen, Urine <2.0 <2.0 mg/dL    Nitrite, Urine NEGATIVE NEGATIVE    Leukocyte Esterase, Urine NEGATIVE NEGATIVE   Urinalysis Microscopic   Result Value Ref Range    WBC, Urine 1-5 1-5, NONE /HPF    RBC, Urine 3-5 NONE, 1-2, 3-5 /HPF    Bacteria, Urine 1+ (A) NONE SEEN /HPF    Mucus, Urine 1+ Reference range not established. /LPF    Hyaline Casts, Urine 3+ (A) NONE /LPF    Calcium Oxalate Crystals, Urine 1+ NONE, 1+ /HPF    Amorphous  Crystals, Urine 1+ NONE, 1+, 2+ /HPF   Troponin I, High Sensitivity   Result Value Ref Range    Troponin I, High Sensitivity 28 (H) 0 - 20 ng/L   Protime-INR   Result Value Ref Range    Protime 26.4 (H) 9.8 - 12.8 seconds    INR 2.3 (H) 0.9 - 1.1   aPTT   Result Value Ref Range    aPTT 38 27 - 38 seconds   POCT GLUCOSE   Result Value Ref Range    POCT Glucose 208 (H) 74 - 99 mg/dL          Assessment/Plan   Damon is a 78-year-old male patient is alert and oriented x3 presenting to emergency department for evaluation of generalized weakness.  Patient also reports right lateral rib pain and a slight headache.  Patient was found to have an RAY, given IV fluids, found to have 1/6 rib fracture which is slightly displaced.  Patient denies fall or trauma.  CT of the head showing bilateral hygromas but negative for acute process.  Vital signs stable.  Patient admitted for further medical management.    RAY  Admit to telemetry observation per Dr. Singh  See imaging results above  Continue IV fluids; BNP 46  Blood cultures pending  Repeat labs in a.m.  Trend troponin    Diabetes/BPH/A-fib/hypertension/depression/hyperlipidemia  Continue home medications when med rec is complete  Diabetic diet  ISS  Hypoglycemia protocol  Telemetry monitoring    DVT PPx  Continue home warfarin when med rec is complete  SCDs  PT/OT  Activity as tolerated    I spent 25 minutes in the professional and overall care of this patient.      Roseanna Edward, APRN-CNP

## 2023-11-10 NOTE — NURSING NOTE
Patient admitted to 3 Observation Unit Room 340. Patient lives home alone in [place] with Dog and Cat. Patient Alert and Oriented X3, Forgetful at times, patient admitted for generalized weakness, not feeling well, right sided rib pain. Patient stated pain 4/10. Patient call bell provided, updated on Plan Of Care. Awaiting medication rec to be completed, notified Dr. Prescott, Patient does not want to be FULL code.

## 2023-11-10 NOTE — PROGRESS NOTES
Occupational Therapy    Evaluation    Patient Name: Damon Barone  MRN: [id number]  Today's Date: 11/10/2023  Time Calculation  Start Time: 0922  Stop Time: 0930  Time Calculation (min): 8 min        Assessment:  Prognosis: Fair  End of Session Communication: Bedside nurse  End of Session Patient Position: Alarm on, Bed, 2 rail up    Plan:  Treatment Interventions: ADL retraining, Functional transfer training, UE strengthening/ROM, Patient/family training, Equipment evaluation/education, Endurance training  OT Frequency: 3 times per week  OT Discharge Recommendations: Moderate intensity level of continued care  OT - OK to Discharge: Yes (from actue OT services to next level of care when medically cleared)  Treatment Interventions: ADL retraining, Functional transfer training, UE strengthening/ROM, Patient/family training, Equipment evaluation/education, Endurance training    Subjective   Current Problem:  1. RAY (acute kidney injury) (CMS/HCC)        2. Weakness          General:  General  Reason for Referral: impaired ADLs; patient to ED with generalized weakness, lightheadedness, (R) lateral rib pain and HA  Referred By: Abdoulaye Singh MD; 11/9/25023  Past Medical History Relevant to Rehab: DM, BPH, heart blockw ith pacer, HRN, depression, HLD, hypothyroidism  Co-Treatment: PT  Co-Treatment Reason: to facilitate safety and activity tolerance  Prior to Session Communication: Bedside nurse  Patient Position Received: Bed, 2 rail up  General Comment: CTH: (-) acute  Precautions:  Precautions Comment: Fall Precautions    Pain:  Pain Assessment  Pain Assessment: 0-10  Pain Score: 3 ((R) ribs, RN notified)    Objective   Cognition:  Overall Cognitive Status: Within Functional Limits           Home Living:  Home Living Comments: patient reporting that he lives at home alone in a one story house without LUIS: does not utilzie AD but owns cane and FWW; patient independent with ADLs, IADLs, (+) driving; patient with tub shower  and stands at PLOF, grab bars installed; declines recent falls    ADL:  ADL Comments: Toileting: anticipate CGA; UB Dressing: anticiapte MOD I; LB Dressing: anticiapte CGA: Grooming: supervision; Feeding: MOD I    Bed Mobility/Transfers: Bed Mobility  Bed Mobility: Yes  Bed Mobility 1  Bed Mobility Comments 1: Supine <> Sit: SBA    Transfers  Transfer: Yes (sit <> stand with FWW at CGA level and completing functional mobility with FWW assist at CGA level)    Sensation:  Sensation Comment: declines numbness/tingling  Strength:  Strength Comments: (B) UE grossly 4/5 observed through functinal activity    Extremities: RUE   RUE : Within Functional Limits and LUE   LUE: Within Functional Limits      Outcome Measures:[facility] Daily Activity  Putting on and taking off regular lower body clothing: A little  Bathing (including washing, rinsing, drying): A little  Putting on and taking off regular upper body clothing: A little  Toileting, which includes using toilet, bedpan or urinal: A little  Taking care of personal grooming such as brushing teeth: A little  Eating Meals: None  Daily Activity - Total Score: 19        Education Documentation  Body Mechanics, taught by Anna Estrada OT at 11/10/2023 11:43 AM.  Learner: Patient  Readiness: Acceptance  Method: Explanation  Response: Verbalizes Understanding    ADL Training, taught by Anna Estrada OT at 11/10/2023 11:43 AM.  Learner: Patient  Readiness: Acceptance  Method: Explanation  Response: Verbalizes Understanding    Education Comments  No comments found.        OP EDUCATION:  Education  Individual(s) Educated: Patient  Education Provided: Fall precautons, Ergonomics and postural realignment  Education Comment: will require continued education    Goals:  Encounter Problems       Encounter Problems (Active)       OT Goals       Patient will complete functional transfers at MOD I level with LRD to facilitate increased independence and safety with home going   (Progressing)       Start:  11/10/23    Expected End:  11/24/23            Patient will complete functional functional mobility for household distances at MOD I level with LRD to facilitate increased independence and safety with home going  (Progressing)       Start:  11/10/23    Expected End:  11/24/23            Patient will complete LB dressing at MOD I level to facilitate safety and independence for home going   (Progressing)       Start:  11/10/23    Expected End:  11/24/23            Patient will complete UB dressing at MOD I level to facilitate safety and independence for home going   (Progressing)       Start:  11/10/23    Expected End:  11/24/23            Patient will complete toileting at MOD I level to facilitate safety and independence for home going   (Progressing)       Start:  11/10/23    Expected End:  11/24/23

## 2023-11-10 NOTE — ED PROVIDER NOTES
Limitations to History: none  External Records Reviewed  Independent Historians: none  Social determinants affecting care: none    HPI  Damon Barone is a 78 y.o. male who presents emergency department for assessment of generalized weakness.  He reports he just generally does not feel well for the last few days.  He is reporting pain to the right lateral rib and a slight headache.  He reports that he is been feeling very lightheaded at home.  He denies any cough or congestion.  He reports feeling slightly short of breath due to the right-sided rib pain.  He denies any falls or traumas to his ribs.  He denies nausea, vomiting, diarrhea.  He denies any abdominal pains.  He has not had any fever or chills.  He has no further complaints.    [facility]  Past Medical History:   Diagnosis Date    Difficulty in walking, not elsewhere classified     Difficulty walking    Disorder of teeth and supporting structures, unspecified     Dental disorder    Other abnormalities of breathing     Breathing difficulty    Pain in unspecified joint     Joint pain    Pain in unspecified limb     Limb pain    Personal history of other diseases of the musculoskeletal system and connective tissue     History of low back pain    Personal history of other diseases of the nervous system and sense organs     History of blurred vision    Personal history of other specified conditions     History of weakness    Personal history of other specified conditions     History of urinary frequency    Personal history of other specified conditions     History of fatigue    Retention of urine, unspecified     Incomplete emptying of bladder    Spontaneous ecchymoses     Bruises easily    reviewed by myself.    Meds  No current outpatient medications    Allergies  Allergies   Allergen Reactions    Codeine Confusion     Altered mental status    reviewed by myself.    SHx    reviewed by  myself.      ------------------------------------------------------------------------------------------------------------------------------------------    /84   Pulse 74   Temp 36.2 °C (97.2 °F) (Temporal)   Resp (!) 28   SpO2 96%     Physical Exam  Vitals and nursing note reviewed.   Constitutional:       General: He is not in acute distress.     Appearance: Normal appearance. He is normal weight. He is ill-appearing. He is not toxic-appearing.   HENT:      Head: Normocephalic.      Nose: Nose normal.      Mouth/Throat:      Mouth: Mucous membranes are dry.      Pharynx: Oropharynx is clear.   Eyes:      Extraocular Movements: Extraocular movements intact.      Conjunctiva/sclera: Conjunctivae normal.   Cardiovascular:      Rate and Rhythm: Normal rate and regular rhythm.   Pulmonary:      Effort: Pulmonary effort is normal.      Breath sounds: Normal breath sounds.   Chest:      Chest wall: Tenderness (right lateral rib tenderness) present.   Abdominal:      General: Abdomen is flat. Bowel sounds are normal. There is no distension.      Palpations: Abdomen is soft.      Tenderness: There is no abdominal tenderness.   Musculoskeletal:         General: Normal range of motion.      Cervical back: Neck supple.   Skin:     General: Skin is warm and dry.   Neurological:      General: No focal deficit present.      Mental Status: He is alert and oriented to person, place, and time.   Psychiatric:         Attention and Perception: Attention normal.         Mood and Affect: Mood normal.          ------------------------------------------------------------------------------------------------------------------------------------------  Imaging  XR chest 1 view   Final Result   1.  Slightly displaced posterolateral fracture of the right 6th rib.   No pneumothorax, pulmonary contusion or pleural effusion                  MACRO:   None        Signed by: Srikanth Gomez 11/9/2023 2:47 PM   Dictation workstation:    [id number]      CT abdomen pelvis wo IV contrast    (Results Pending)   CT head wo IV contrast    (Results Pending)        Labs  Labs Reviewed   CBC WITH AUTO DIFFERENTIAL - Abnormal       Result Value    WBC 13.8 (*)     nRBC 0.0      RBC 6.39 (*)     Hemoglobin 18.9 (*)     Hematocrit 56.1 (*)     MCV 88      MCH 29.6      MCHC 33.7      RDW 15.0 (*)     Platelets 262      Neutrophils % 83.2      Immature Granulocytes %, Automated 1.3 (*)     Lymphocytes % 7.0      Monocytes % 7.2      Eosinophils % 0.8      Basophils % 0.5      Neutrophils Absolute 11.45 (*)     Immature Granulocytes Absolute, Automated 0.18      Lymphocytes Absolute 0.96      Monocytes Absolute 0.99 (*)     Eosinophils Absolute 0.11      Basophils Absolute 0.07     COMPREHENSIVE METABOLIC PANEL - Abnormal    Glucose 200 (*)     Sodium 128 (*)     Potassium 4.0      Chloride 92 (*)     Bicarbonate 20 (*)     Anion Gap 20      Urea Nitrogen 83 (*)     Creatinine 2.40 (*)     eGFR 27 (*)     Calcium 9.5      Albumin 4.8      Alkaline Phosphatase 163 (*)     Total Protein 8.1      AST 27      Bilirubin, Total 1.0      ALT 35     TROPONIN I, HIGH SENSITIVITY - Abnormal    Troponin I, High Sensitivity 36 (*)     Narrative:     Less than 99th percentile of normal range cutoff-  Female and children under 18 years old <14 ng/L; Male <21 ng/L: Negative  Repeat testing should be performed if clinically indicated.     Female and children under 18 years old 14-50 ng/L; Male 21-50 ng/L:  Consistent with possible cardiac damage and possible increased clinical   risk. Serial measurements may help to assess extent of myocardial damage.     >50 ng/L: Consistent with cardiac damage, increased clinical risk and  myocardial infarction. Serial measurements may help assess extent of   myocardial damage.      NOTE: Children less than 1 year old may have higher baseline troponin   levels and results should be interpreted in conjunction with the overall   clinical  context.     NOTE: Troponin I testing is performed using a different   testing methodology at [facility] than at other   [facility]. Direct result comparisons should only   be made within the same method.   LACTATE - Abnormal    Lactate 2.1 (*)     Narrative:     Venipuncture immediately after or during the administration of Metamizole may lead to falsely low results. Testing should be performed immediately  prior to Metamizole dosing.   URINALYSIS WITH REFLEX MICROSCOPIC AND CULTURE - Abnormal    Color, Urine Yellow      Appearance, Urine Hazy (*)     Specific Gravity, Urine 1.016      pH, Urine 5.0      Protein, Urine 30 (1+) (*)     Glucose, Urine 50 (1+) (*)     Blood, Urine MODERATE (2+) (*)     Ketones, Urine NEGATIVE      Bilirubin, Urine NEGATIVE      Urobilinogen, Urine <2.0      Nitrite, Urine NEGATIVE      Leukocyte Esterase, Urine NEGATIVE     URINALYSIS MICROSCOPIC WITH REFLEX CULTURE - Abnormal    WBC, Urine 1-5      RBC, Urine 3-5      Bacteria, Urine 1+ (*)     Mucus, Urine 1+      Hyaline Casts, Urine 3+ (*)     Calcium Oxalate Crystals, Urine 1+      Amorphous Crystals, Urine 1+     BLOOD CULTURE - Normal    Blood Culture Loaded on Instrument - Culture in progress     BLOOD CULTURE - Normal    Blood Culture Loaded on Instrument - Culture in progress     INFLUENZA A AND B PCR - Normal    Flu A Result Not Detected      Flu B Result Not Detected      Narrative:     This assay is an in vitro diagnostic multiplex nucleic acid amplification test for the detection and discrimination of Influenza A & B from nasopharyngeal specimens, and has been validated for use at [facility]. Negative results do not preclude Influenza A/B infections, and should not be used as the sole basis for diagnosis, treatment, or other management decisions. If Influenza A/B and RSV PCR results are negative, testing for Parainfluenza virus, Adenovirus and Metapneumovirus is routinely  performed for [facility] pediatric oncology and intensive care inpatients, and is available on other patients by placing an add-on request.   SARS-COV-2 PCR, SYMPTOMATIC - Normal    Coronavirus 2019, PCR Not Detected      Narrative:     This assay has received FDA Emergency Use Authorization (EUA) and is only authorized for the duration of time that circumstances exist to justify the authorization of the emergency use of in vitro diagnostic tests for the detection of SARS-CoV-2 virus and/or diagnosis of COVID-19 infection under section 564(b)(1) of the Act, 21 U.S.C. 360bbb-3(b)(1). This assay is an in vitro diagnostic nucleic acid amplification test for the qualitative detection of SARS-CoV-2 from nasopharyngeal specimens and has been validated for use at [facility]. Negative results do not preclude COVID-19 infections and should not be used as the sole basis for diagnosis, treatment, or other management decisions.     LACTATE - Normal    Lactate 1.4      Narrative:     Venipuncture immediately after or during the administration of Metamizole may lead to falsely low results. Testing should be performed immediately  prior to Metamizole dosing.   URINALYSIS WITH REFLEX MICROSCOPIC AND CULTURE    Narrative:     The following orders were created for panel order Urinalysis with Reflex Microscopic and Culture.  Procedure                               Abnormality         Status                     ---------                               -----------         ------                     Urinalysis with Reflex M...[[id number]]  Abnormal            Final result               Extra Urine Gray Tube[[id number]]                                                         Please view results for these tests on the individual orders.   EXTRA URINE GRAY TUBE   TROPONIN I, HIGH SENSITIVITY   PROTIME-INR   APTT        ED Course  Diagnoses as of 11/09/23 2001   RAY (acute kidney injury) (CMS/[facility])   Weakness        Medical  Decision Making: He appears chronically ill.  Vital signs reviewed.  He is hypotensive.  He is not tachycardic.  He is afebrile.  He is placed in a continuous cardiac and pulse ox monitor.  Comprehensive work-up was initiated.    Differential diagnoses considered: UTI, pneumonia, dehydration, electrolyte abnormalities, others    Medications given: 500 mL of IV fluids,    EKG interpreted by myself: Atrial fibrillation.  Ventricular rate 61 bpm.  This is a paced rhythm.  Left bundle branch block noted.    I reviewed the labs from today.  There is a leukocytosis at 13.8.  H&H were both elevated 18.9 and 56.1.  Normal platelets.  Glucose 200.  Sodium 128.  Bicarb 20.  BUN 83 with a creatinine of 2.40 which is new.  Urinalysis showing moderate mount of blood with 1+ bacteria.  Patient was straight cath so the blood could be traumatic from the straight cath.  COVID and influenza testing negative.  Troponin elevated at 36.  I consulted his PCP.  I spoke with Dr. Singh.  Dr. Singh reports that he saw him in the office and he was altered and he would like a head scan.  During my assessment, he was never altered however I will obtain a CT of the head to make sure there is no acute abnormality with this new information.  Case was discussed and evaluated with ED attending, Dr. Moctezuma.  He is agreeable to patient plan of care.  He will be admitted to his PCP regardless of CT findings.           Candelario Knowles PA-C  11/09/23 2002

## 2023-11-10 NOTE — CARE PLAN
The patient's goals for the shift include pain management to right cage.     The clinical goals for the shift include decreased chest/ rib discomfort with pain ordered pain meds.

## 2023-11-11 ENCOUNTER — TELEPHONE (OUTPATIENT)
Dept: INTERNAL MEDICINE | Facility: HOSPITAL | Age: 78
End: 2023-11-11

## 2023-11-13 LAB
BACTERIA BLD CULT: NORMAL
BACTERIA BLD CULT: NORMAL

## 2023-11-16 ENCOUNTER — EXTERNAL HOSPITAL ADMISSION (OUTPATIENT)
Dept: NEUROSURGERY | Facility: CLINIC | Age: 78
End: 2023-11-16
Payer: MEDICARE

## 2023-11-29 ENCOUNTER — TELEPHONE (OUTPATIENT)
Dept: PRIMARY CARE | Facility: CLINIC | Age: 78
End: 2023-11-29
Payer: MEDICARE

## 2023-12-13 NOTE — DISCHARGE SUMMARY
Discharge Diagnosis  Acute kidney injury (CMS/HCC)    Issues Requiring Follow-Up  Monitoring the kidney function as outpatient    Discharge Meds     Your medication list        START taking these medications        Instructions Last Dose Given Next Dose Due   acetaminophen 325 mg tablet  Commonly known as: Tylenol      Take 2 tablets (650 mg) by mouth every 4 hours if needed for mild pain (1 - 3) or fever (temp greater than 38.0 C).       atorvastatin 40 mg tablet  Commonly known as: Lipitor      Take 1 tablet (40 mg) by mouth once daily at bedtime.       melatonin 3 mg tablet      Take 1 tablet (3 mg) by mouth once daily.       sertraline 100 mg tablet  Commonly known as: Zoloft  Start taking on: November 11, 2023      Take 1 tablet (100 mg) by mouth once daily. Do not start before November 11, 2023.       tamsulosin 0.4 mg 24 hr capsule  Commonly known as: Flomax  Start taking on: November 11, 2023      Take 1 capsule (0.4 mg) by mouth once daily. Do not start before November 11, 2023.       warfarin 5 mg tablet  Commonly known as: Coumadin  Start taking on: November 11, 2023      Take as directed per After Visit Summary. Do not start before November 11, 2023.                 Where to Get Your Medications        These medications were sent to [id number] [place], [place] - [address]  [address] [facility] [address]      Phone: [phone]   acetaminophen 325 mg tablet  atorvastatin 40 mg tablet  melatonin 3 mg tablet  sertraline 100 mg tablet  tamsulosin 0.4 mg 24 hr capsule  warfarin 5 mg tablet         Test Results Pending At Discharge  Pending Labs       Order Current Status    Extra Urine Gray Tube Collected (11/09/23 0797)    Urinalysis with Reflex Microscopic and Culture In process            Hospital Course     Damon Barone is a 78 y.o. male presenting to emergency department for evaluation of generalized weakness.  Patient reports that he just  generally does not feel well and has not for the last several days.  Patient also reports pain to the right lateral rib and a slight headache.  He reports that he has been feeling very lightheaded at home.  Patient denies cough or congestion.  Patient reports feeling slightly short of breath due to right-sided rib pain.  Patient denies any fall or trauma.  Patient denies nausea, vomiting, diarrhea.  Patient denies abdominal pain.  Patient denies recent illness, fever, or chills.     In ED, a chest x-ray was completed and patient was found to have a 6 rib fracture, slightly displaced, per radiology review.  Flu negative, COVID-negative, lactate 1.4.  Glucose 200, sodium 128, chloride 92, bicarb 20, BUN 83, creatinine 2.40, GFR 27, alk phos 163, troponin 36 with a repeat of 28.  WBCs 13.8, hemoglobin 18.9, hematocrit 56.1, PT 26.4, INR 2.3.  Urinalysis showing moderate blood, 50 glucose, 30 protein, 1+ bacteria.  Blood pressure 111/84, heart rate 74, respirations 28, temperature 36.2 °C, SPO2 96% on room air.  CT of the head completed showing no acute process, bifrontal subdural hygromas noted per radiology review.  CT of the abdomen pelvis completed showing bilateral renal cortical atrophy with fat stranding around the kidneys per radiology review.  Patient given IV fluids, BNP 46.  Blood cultures pending.  Patient admitted to telemetry observation under the care of Dr. Singh who will continue to follow.  I was asked to do H&P and place initial admission orders.  Patient was admitted to the hospital and was hydrated with IV fluid, on the second day there was significant decline in the BUN/creatinine.  It was decided to discharge the patient home off of diuretics that he was taking at home and have his BMP checked as outpatient.  At the time of discharge patient was ambulating.  He was not having any further dizziness or lightheadedness.                        Pertinent Physical Exam At Time of Discharge  Physical  Exam  Patient is fully awake and alert oriented x 3 in no distress    Elderly male in no distress    Lungs bilateral clear auscultation    Heart is irregularly irregular    Abdomen soft and nontender    Extremities no edema  Outpatient Follow-Up  No future appointments.      Abdoulaye Singh MD

## 2024-02-06 LAB
ATRIAL RATE: 259 BPM
P AXIS: 268 DEGREES
PR INTERVAL: 76 MS
Q ONSET: 252 MS
QRS COUNT: 9 BEATS
QRS DURATION: 189 MS
QT INTERVAL: 478 MS
QTC CALCULATION(BAZETT): 482 MS
QTC FREDERICIA: 480 MS
R AXIS: -32 DEGREES
T AXIS: 133 DEGREES
T OFFSET: 491 MS
VENTRICULAR RATE: 61 BPM